# Patient Record
Sex: FEMALE | URBAN - METROPOLITAN AREA
[De-identification: names, ages, dates, MRNs, and addresses within clinical notes are randomized per-mention and may not be internally consistent; named-entity substitution may affect disease eponyms.]

---

## 2024-04-04 ENCOUNTER — HOSPITAL ENCOUNTER (OUTPATIENT)
Facility: HOSPITAL | Age: 24
Discharge: HOME | End: 2024-04-04
Attending: OBSTETRICS & GYNECOLOGY | Admitting: OBSTETRICS & GYNECOLOGY
Payer: COMMERCIAL

## 2024-04-04 VITALS
DIASTOLIC BLOOD PRESSURE: 80 MMHG | SYSTOLIC BLOOD PRESSURE: 130 MMHG | RESPIRATION RATE: 18 BRPM | HEART RATE: 99 BPM | OXYGEN SATURATION: 97 % | TEMPERATURE: 98.2 F

## 2024-04-04 PROCEDURE — 59412 ANTEPARTUM MANIPULATION: CPT

## 2024-04-04 PROCEDURE — 6360000002 HC RX W HCPCS: Performed by: MIDWIFE

## 2024-04-04 PROCEDURE — 96360 HYDRATION IV INFUSION INIT: CPT

## 2024-04-04 PROCEDURE — 59025 FETAL NON-STRESS TEST: CPT

## 2024-04-04 PROCEDURE — 2580000003 HC RX 258: Performed by: MIDWIFE

## 2024-04-04 RX ORDER — SODIUM CHLORIDE 0.9 % (FLUSH) 0.9 %
5-40 SYRINGE (ML) INJECTION PRN
Status: DISCONTINUED | OUTPATIENT
Start: 2024-04-04 | End: 2024-04-04 | Stop reason: HOSPADM

## 2024-04-04 RX ORDER — SERTRALINE HYDROCHLORIDE 25 MG/1
25 TABLET, FILM COATED ORAL DAILY
COMMUNITY

## 2024-04-04 RX ORDER — SODIUM CHLORIDE 0.9 % (FLUSH) 0.9 %
5-40 SYRINGE (ML) INJECTION EVERY 12 HOURS SCHEDULED
Status: DISCONTINUED | OUTPATIENT
Start: 2024-04-04 | End: 2024-04-04 | Stop reason: HOSPADM

## 2024-04-04 RX ORDER — SODIUM CHLORIDE 9 MG/ML
INJECTION, SOLUTION INTRAVENOUS PRN
Status: DISCONTINUED | OUTPATIENT
Start: 2024-04-04 | End: 2024-04-04 | Stop reason: HOSPADM

## 2024-04-04 RX ORDER — CETIRIZINE HYDROCHLORIDE 10 MG/1
10 TABLET ORAL DAILY
COMMUNITY

## 2024-04-04 RX ORDER — TERBUTALINE SULFATE 1 MG/ML
0.25 INJECTION, SOLUTION SUBCUTANEOUS ONCE
Status: COMPLETED | OUTPATIENT
Start: 2024-04-04 | End: 2024-04-04

## 2024-04-04 RX ORDER — SODIUM CHLORIDE, SODIUM LACTATE, POTASSIUM CHLORIDE, CALCIUM CHLORIDE 600; 310; 30; 20 MG/100ML; MG/100ML; MG/100ML; MG/100ML
INJECTION, SOLUTION INTRAVENOUS CONTINUOUS
Status: DISCONTINUED | OUTPATIENT
Start: 2024-04-04 | End: 2024-04-04 | Stop reason: HOSPADM

## 2024-04-04 RX ADMIN — TERBUTALINE SULFATE 0.25 MG: 1 INJECTION, SOLUTION SUBCUTANEOUS at 17:56

## 2024-04-04 RX ADMIN — SODIUM CHLORIDE, POTASSIUM CHLORIDE, SODIUM LACTATE AND CALCIUM CHLORIDE: 600; 310; 30; 20 INJECTION, SOLUTION INTRAVENOUS at 14:45

## 2024-04-04 NOTE — PROCEDURES
PROCEDURE NOTE  Date: 4/4/2024   Name: Danika Miller  YOB: 2000    Procedures  External cephalic version    Breech presentation  37 weeks    Consent confirmed. FHR category 1. US verified breech presentation. Fluid normal. After terbutaline given, fetal head rotated to maternal left with buttocks rotated to maternal right. US verified vertex presentation. Fetal heart rate normal. Mother and baby tolerated the procedure well. Plan for IOL 39 weeks.

## 2024-04-04 NOTE — H&P
History & Physical    Name: Danika Miller MRN: [id number]  SSN: [id number]    YOB: 2000  Age: 24 y.o.  Sex: female        Subjective:     Estimated Date of Delivery: None noted.  OB History    Para Term  AB Living   1             SAB IAB Ectopic Molar Multiple Live Births                    # Outcome Date GA Lbr Hiram/2nd Weight Sex Delivery Anes PTL Lv   1 Current                Ms. Miller is admitted with pregnancy at 37.2 for  ECV . Prenatal course was complicated by breech presentation, and IBS. Please see prenatal records for details.    No past medical history on file.  No past surgical history on file.  Social History     Occupational History    Not on file   Tobacco Use    Smoking status: Not on file    Smokeless tobacco: Not on file   Substance and Sexual Activity    Alcohol use: Not on file    Drug use: Not on file    Sexual activity: Not on file     No family history on file.    Not on File  Prior to Admission medications    Not on File        Review of Systems: Pertinent items are noted in HPI.    Objective:     Vitals:  There were no vitals filed for this visit.     Physical Exam:  General:   alert, appears stated age, and cooperative   Skin:   normal       Lungs:   clear to auscultation bilaterally   Heart:   regular rate and rhythm, S1, S2 normal, no murmur, click, rub or gallop   Breasts:   normal without suspicious masses, skin or nipple changes or axillary nodes, self-exam is taught and encouraged   Abdomen:  soft, non-tender; bowel sounds normal; no masses,  no organomegaly   Cervical exam:  deferred        Membranes:  Intact  Fetal Heart Rate: Baseline: 145 per minute  Variability: moderate  Accelerations: yes  Decelerations: none  Uterine contractions: uterine irritability    Prenatal Labs:   No results found for: \"ABORH\", \"RUBELLAEXT\", \"GRBSEXT\", \"HBSAGEXT\", \"HIVEXT\", \"RPREXT\", \"GONNOEXT\", \"CHLAMEXT\"      Assessment/Plan:     Principal Problem:    Maternal care for

## 2024-04-05 ENCOUNTER — HOSPITAL ENCOUNTER (OUTPATIENT)
Facility: HOSPITAL | Age: 24
Discharge: HOME | End: 2024-04-05
Attending: OBSTETRICS & GYNECOLOGY | Admitting: OBSTETRICS & GYNECOLOGY
Payer: COMMERCIAL

## 2024-04-05 VITALS
TEMPERATURE: 98.7 F | SYSTOLIC BLOOD PRESSURE: 132 MMHG | RESPIRATION RATE: 21 BRPM | OXYGEN SATURATION: 98 % | HEIGHT: 67 IN | DIASTOLIC BLOOD PRESSURE: 75 MMHG | HEART RATE: 98 BPM | WEIGHT: 185 LBS | BODY MASS INDEX: 29.03 KG/M2

## 2024-04-05 PROBLEM — Z33.1 IUP (INTRAUTERINE PREGNANCY), INCIDENTAL: Status: ACTIVE | Noted: 2024-04-05

## 2024-04-05 PROCEDURE — 76818 FETAL BIOPHYS PROFILE W/NST: CPT

## 2024-04-05 PROCEDURE — 99212 OFFICE O/P EST SF 10 MIN: CPT

## 2024-04-05 PROCEDURE — 59025 FETAL NON-STRESS TEST: CPT

## 2024-04-05 NOTE — PROGRESS NOTES
1753 Dr Mcallister at patient bedside.  US performed and breech presentation confirmed.  ECV performed by Dr Mcallister and was successful. Vertex presentation confirmed by US.  Plan for IOL at 39 weeks per MD.  
1950 EFM tracing reviewed with RAHAT Berry CNM. Orders for DC received.     2004  Written and verbal DC instructions given to include daily fetal kick counts, labor precautions. Verbalizes understanding. All questions answered. IV removed. Up to get dressed.     2007 Ambulated off the unit in no apparent distress with family.     
 used

## 2024-04-06 NOTE — PROGRESS NOTES
2030: Pt is a 24 y.o.  at 37w3d, arrived to L&D triage with c/o of intermittent abdominal cramping, describing it as intermittently sharp in nature and lasting 15-20 minutes at a time. Underwent successful ECV yesterday afternoon with Dr. Mcallister. Escorted to bathroom to obtain urine sample, then to triage bed 1 where EFM and TOCO applied, call bell within reach.    : Reactive NST reviewed with BRISA Gusman. Provider to bedside to discuss patient's concerns and plan of care.    : Bedside US performed.     : Discharge instructions provided to include gestational age expectations, round ligament pain and KT-taping, and early labor self care. Reviewed return precautions. Pt verbalized understanding.    : Pt discharged to home. Ambulated off unit with spouse and mother.

## 2024-04-06 NOTE — PROGRESS NOTES
Pt to L&D at 37 4/7 weeks c/o intermittent cramping that is sharp.  NST RGA, occasional contractions noted and pt sts she \"feels better now that she is  here\".  SVE 1/80/-1 Vertex on SVE.  Post ECV procedure on 4/4 for breech.  Bedside LTD US performed by BRISA showed Vertex presentation.  Reviewed precautions, FM and how to contact on call CNM.  DC home with family  Follow up in office as scheduled.     Tracey Gusman, SCOOTER - BRISA  4/5/2024

## 2024-04-06 NOTE — DISCHARGE INSTRUCTIONS
Investigate KT-taping for pregnancy.   General reference: [email or web address]

## 2024-04-12 ENCOUNTER — ANESTHESIA EVENT (OUTPATIENT)
Facility: HOSPITAL | Age: 24
End: 2024-04-12
Payer: COMMERCIAL

## 2024-04-12 ENCOUNTER — ANESTHESIA (OUTPATIENT)
Facility: HOSPITAL | Age: 24
End: 2024-04-12
Payer: COMMERCIAL

## 2024-04-12 ENCOUNTER — HOSPITAL ENCOUNTER (INPATIENT)
Facility: HOSPITAL | Age: 24
LOS: 2 days | Discharge: HOME | End: 2024-04-14
Attending: OBSTETRICS & GYNECOLOGY | Admitting: OBSTETRICS & GYNECOLOGY
Payer: COMMERCIAL

## 2024-04-12 PROBLEM — O42.92 FULL-TERM PREMATURE RUPTURE OF MEMBRANES: Status: ACTIVE | Noted: 2024-04-12

## 2024-04-12 PROBLEM — Z34.93 NORMAL IUP (INTRAUTERINE PREGNANCY) ON PRENATAL ULTRASOUND, THIRD TRIMESTER: Status: ACTIVE | Noted: 2024-04-12

## 2024-04-12 LAB
ABO + RH BLD: NORMAL
BASOPHILS # BLD: 0 K/UL (ref 0–0.1)
BASOPHILS NFR BLD: 0 % (ref 0–2)
BLOOD GROUP ANTIBODIES SERPL: NORMAL
DIFFERENTIAL METHOD BLD: ABNORMAL
EOSINOPHIL # BLD: 0 K/UL (ref 0–0.4)
EOSINOPHIL NFR BLD: 0 % (ref 0–5)
ERYTHROCYTE [DISTWIDTH] IN BLOOD BY AUTOMATED COUNT: 13.6 % (ref 11.6–14.5)
HCT VFR BLD AUTO: 35.4 % (ref 35–45)
HGB BLD-MCNC: 11.9 G/DL (ref 12–16)
IMM GRANULOCYTES # BLD AUTO: 0 K/UL
IMM GRANULOCYTES NFR BLD AUTO: 0 %
LYMPHOCYTES # BLD: 1.7 K/UL (ref 0.9–3.6)
LYMPHOCYTES NFR BLD: 15 % (ref 21–52)
MCH RBC QN AUTO: 30.1 PG (ref 24–34)
MCHC RBC AUTO-ENTMCNC: 33.6 G/DL (ref 31–37)
MCV RBC AUTO: 89.4 FL (ref 78–100)
MONOCYTES # BLD: 1.2 K/UL (ref 0.05–1.2)
MONOCYTES NFR BLD: 11 % (ref 3–10)
NEUTS BAND NFR BLD MANUAL: 1 % (ref 0–5)
NEUTS SEG # BLD: 8.4 K/UL (ref 1.8–8)
NEUTS SEG NFR BLD: 73 % (ref 40–73)
NRBC # BLD: 0 K/UL (ref 0–0.01)
NRBC BLD-RTO: 0 PER 100 WBC
PLATELET # BLD AUTO: 200 K/UL (ref 135–420)
PMV BLD AUTO: 12 FL (ref 9.2–11.8)
RBC # BLD AUTO: 3.96 M/UL (ref 4.2–5.3)
RBC MORPH BLD: ABNORMAL
SPECIMEN EXP DATE BLD: NORMAL
WBC # BLD AUTO: 11.3 K/UL (ref 4.6–13.2)

## 2024-04-12 PROCEDURE — 3700000156 HC EPIDURAL ANESTHESIA

## 2024-04-12 PROCEDURE — 1100000000 HC RM PRIVATE

## 2024-04-12 PROCEDURE — 6360000002 HC RX W HCPCS

## 2024-04-12 PROCEDURE — 86850 RBC ANTIBODY SCREEN: CPT

## 2024-04-12 PROCEDURE — 3700000025 EPIDURAL BLOCK: Performed by: NURSE ANESTHETIST, CERTIFIED REGISTERED

## 2024-04-12 PROCEDURE — 86900 BLOOD TYPING SEROLOGIC ABO: CPT

## 2024-04-12 PROCEDURE — 2500000003 HC RX 250 WO HCPCS: Performed by: NURSE ANESTHETIST, CERTIFIED REGISTERED

## 2024-04-12 PROCEDURE — 7210000100 HC LABOR FEE PER 1 HR

## 2024-04-12 PROCEDURE — 00HU33Z INSERTION OF INFUSION DEVICE INTO SPINAL CANAL, PERCUTANEOUS APPROACH: ICD-10-PCS | Performed by: OBSTETRICS & GYNECOLOGY

## 2024-04-12 PROCEDURE — 86901 BLOOD TYPING SEROLOGIC RH(D): CPT

## 2024-04-12 PROCEDURE — 85025 COMPLETE CBC W/AUTO DIFF WBC: CPT

## 2024-04-12 PROCEDURE — 2580000003 HC RX 258: Performed by: MIDWIFE

## 2024-04-12 RX ORDER — TERBUTALINE SULFATE 1 MG/ML
0.25 INJECTION, SOLUTION SUBCUTANEOUS
Status: DISCONTINUED | OUTPATIENT
Start: 2024-04-12 | End: 2024-04-13 | Stop reason: HOSPADM

## 2024-04-12 RX ORDER — SODIUM CHLORIDE, SODIUM LACTATE, POTASSIUM CHLORIDE, CALCIUM CHLORIDE 600; 310; 30; 20 MG/100ML; MG/100ML; MG/100ML; MG/100ML
INJECTION, SOLUTION INTRAVENOUS CONTINUOUS
Status: DISCONTINUED | OUTPATIENT
Start: 2024-04-12 | End: 2024-04-12

## 2024-04-12 RX ORDER — SODIUM CHLORIDE 0.9 % (FLUSH) 0.9 %
5-40 SYRINGE (ML) INJECTION PRN
Status: DISCONTINUED | OUTPATIENT
Start: 2024-04-12 | End: 2024-04-12

## 2024-04-12 RX ORDER — DOCUSATE SODIUM 100 MG/1
100 CAPSULE, LIQUID FILLED ORAL 2 TIMES DAILY PRN
Status: DISCONTINUED | OUTPATIENT
Start: 2024-04-12 | End: 2024-04-12

## 2024-04-12 RX ORDER — SODIUM CHLORIDE 0.9 % (FLUSH) 0.9 %
5-40 SYRINGE (ML) INJECTION EVERY 12 HOURS SCHEDULED
Status: DISCONTINUED | OUTPATIENT
Start: 2024-04-12 | End: 2024-04-12

## 2024-04-12 RX ORDER — TERBUTALINE SULFATE 1 MG/ML
0.25 INJECTION, SOLUTION SUBCUTANEOUS
Status: DISCONTINUED | OUTPATIENT
Start: 2024-04-12 | End: 2024-04-12

## 2024-04-12 RX ORDER — ONDANSETRON 2 MG/ML
4 INJECTION INTRAMUSCULAR; INTRAVENOUS EVERY 6 HOURS PRN
Status: DISCONTINUED | OUTPATIENT
Start: 2024-04-12 | End: 2024-04-13 | Stop reason: SDUPTHER

## 2024-04-12 RX ORDER — SODIUM CHLORIDE, SODIUM LACTATE, POTASSIUM CHLORIDE, CALCIUM CHLORIDE 600; 310; 30; 20 MG/100ML; MG/100ML; MG/100ML; MG/100ML
INJECTION, SOLUTION INTRAVENOUS CONTINUOUS
Status: DISCONTINUED | OUTPATIENT
Start: 2024-04-12 | End: 2024-04-13 | Stop reason: HOSPADM

## 2024-04-12 RX ORDER — SODIUM CHLORIDE 9 MG/ML
INJECTION, SOLUTION INTRAVENOUS PRN
Status: DISCONTINUED | OUTPATIENT
Start: 2024-04-12 | End: 2024-04-13 | Stop reason: HOSPADM

## 2024-04-12 RX ORDER — SODIUM CHLORIDE, SODIUM LACTATE, POTASSIUM CHLORIDE, AND CALCIUM CHLORIDE .6; .31; .03; .02 G/100ML; G/100ML; G/100ML; G/100ML
1000 INJECTION, SOLUTION INTRAVENOUS PRN
Status: DISCONTINUED | OUTPATIENT
Start: 2024-04-12 | End: 2024-04-13 | Stop reason: HOSPADM

## 2024-04-12 RX ORDER — SODIUM CHLORIDE, SODIUM LACTATE, POTASSIUM CHLORIDE, AND CALCIUM CHLORIDE .6; .31; .03; .02 G/100ML; G/100ML; G/100ML; G/100ML
500 INJECTION, SOLUTION INTRAVENOUS PRN
Status: DISCONTINUED | OUTPATIENT
Start: 2024-04-12 | End: 2024-04-12

## 2024-04-12 RX ORDER — SODIUM CHLORIDE 0.9 % (FLUSH) 0.9 %
5-40 SYRINGE (ML) INJECTION EVERY 12 HOURS SCHEDULED
Status: DISCONTINUED | OUTPATIENT
Start: 2024-04-12 | End: 2024-04-13 | Stop reason: SDUPTHER

## 2024-04-12 RX ORDER — HYDROMORPHONE HYDROCHLORIDE 1 MG/ML
1 INJECTION, SOLUTION INTRAMUSCULAR; INTRAVENOUS; SUBCUTANEOUS
Status: DISCONTINUED | OUTPATIENT
Start: 2024-04-12 | End: 2024-04-12

## 2024-04-12 RX ORDER — HYDROMORPHONE HYDROCHLORIDE 1 MG/ML
1 INJECTION, SOLUTION INTRAMUSCULAR; INTRAVENOUS; SUBCUTANEOUS
Status: DISCONTINUED | OUTPATIENT
Start: 2024-04-12 | End: 2024-04-14 | Stop reason: HOSPADM

## 2024-04-12 RX ORDER — SODIUM CHLORIDE, SODIUM LACTATE, POTASSIUM CHLORIDE, AND CALCIUM CHLORIDE .6; .31; .03; .02 G/100ML; G/100ML; G/100ML; G/100ML
500 INJECTION, SOLUTION INTRAVENOUS PRN
Status: DISCONTINUED | OUTPATIENT
Start: 2024-04-12 | End: 2024-04-13 | Stop reason: HOSPADM

## 2024-04-12 RX ORDER — SODIUM CHLORIDE, SODIUM LACTATE, POTASSIUM CHLORIDE, AND CALCIUM CHLORIDE .6; .31; .03; .02 G/100ML; G/100ML; G/100ML; G/100ML
1000 INJECTION, SOLUTION INTRAVENOUS PRN
Status: DISCONTINUED | OUTPATIENT
Start: 2024-04-12 | End: 2024-04-12

## 2024-04-12 RX ORDER — MISOPROSTOL 200 UG/1
400 TABLET ORAL PRN
Status: DISCONTINUED | OUTPATIENT
Start: 2024-04-12 | End: 2024-04-12

## 2024-04-12 RX ORDER — EPHEDRINE SULFATE/0.9% NACL/PF 50 MG/5 ML
10 SYRINGE (ML) INTRAVENOUS PRN
Status: DISCONTINUED | OUTPATIENT
Start: 2024-04-12 | End: 2024-04-13 | Stop reason: HOSPADM

## 2024-04-12 RX ORDER — SODIUM CHLORIDE 0.9 % (FLUSH) 0.9 %
5-40 SYRINGE (ML) INJECTION 2 TIMES DAILY
Status: DISCONTINUED | OUTPATIENT
Start: 2024-04-12 | End: 2024-04-12

## 2024-04-12 RX ORDER — SIMETHICONE 80 MG
80 TABLET,CHEWABLE ORAL 4 TIMES DAILY PRN
Status: DISCONTINUED | OUTPATIENT
Start: 2024-04-12 | End: 2024-04-13 | Stop reason: SDUPTHER

## 2024-04-12 RX ORDER — ONDANSETRON 4 MG/1
4 TABLET, ORALLY DISINTEGRATING ORAL EVERY 6 HOURS PRN
Status: DISCONTINUED | OUTPATIENT
Start: 2024-04-12 | End: 2024-04-13 | Stop reason: SDUPTHER

## 2024-04-12 RX ORDER — METHYLERGONOVINE MALEATE 0.2 MG/ML
200 INJECTION INTRAVENOUS PRN
Status: DISCONTINUED | OUTPATIENT
Start: 2024-04-12 | End: 2024-04-12

## 2024-04-12 RX ORDER — DOCUSATE SODIUM 100 MG/1
100 CAPSULE, LIQUID FILLED ORAL 2 TIMES DAILY
Status: DISCONTINUED | OUTPATIENT
Start: 2024-04-12 | End: 2024-04-12

## 2024-04-12 RX ORDER — NALOXONE HYDROCHLORIDE 0.4 MG/ML
INJECTION, SOLUTION INTRAMUSCULAR; INTRAVENOUS; SUBCUTANEOUS PRN
Status: DISCONTINUED | OUTPATIENT
Start: 2024-04-12 | End: 2024-04-13 | Stop reason: HOSPADM

## 2024-04-12 RX ORDER — SODIUM CHLORIDE 0.9 % (FLUSH) 0.9 %
5-40 SYRINGE (ML) INJECTION PRN
Status: DISCONTINUED | OUTPATIENT
Start: 2024-04-12 | End: 2024-04-13 | Stop reason: SDUPTHER

## 2024-04-12 RX ORDER — MISOPROSTOL 200 UG/1
400 TABLET ORAL PRN
Status: DISCONTINUED | OUTPATIENT
Start: 2024-04-12 | End: 2024-04-14 | Stop reason: HOSPADM

## 2024-04-12 RX ORDER — ACETAMINOPHEN 500 MG
1000 TABLET ORAL EVERY 6 HOURS PRN
Status: DISCONTINUED | OUTPATIENT
Start: 2024-04-12 | End: 2024-04-13 | Stop reason: HOSPADM

## 2024-04-12 RX ORDER — LIDOCAINE HYDROCHLORIDE 10 MG/ML
30 INJECTION, SOLUTION EPIDURAL; INFILTRATION; INTRACAUDAL; PERINEURAL PRN
Status: DISCONTINUED | OUTPATIENT
Start: 2024-04-12 | End: 2024-04-14 | Stop reason: HOSPADM

## 2024-04-12 RX ORDER — FENTANYL CITRATE 50 UG/ML
25 INJECTION, SOLUTION INTRAMUSCULAR; INTRAVENOUS
Status: DISCONTINUED | OUTPATIENT
Start: 2024-04-12 | End: 2024-04-12

## 2024-04-12 RX ORDER — FENTANYL/ROPIVACAINE/NS/PF 2MCG/ML-.2
8 PLASTIC BAG, INJECTION (ML) INJECTION CONTINUOUS
Status: DISCONTINUED | OUTPATIENT
Start: 2024-04-12 | End: 2024-04-13 | Stop reason: HOSPADM

## 2024-04-12 RX ORDER — ONDANSETRON 2 MG/ML
4 INJECTION INTRAMUSCULAR; INTRAVENOUS EVERY 6 HOURS PRN
Status: DISCONTINUED | OUTPATIENT
Start: 2024-04-12 | End: 2024-04-12

## 2024-04-12 RX ORDER — ONDANSETRON 4 MG/1
4 TABLET, ORALLY DISINTEGRATING ORAL EVERY 6 HOURS PRN
Status: DISCONTINUED | OUTPATIENT
Start: 2024-04-12 | End: 2024-04-12

## 2024-04-12 RX ORDER — SODIUM CHLORIDE 9 MG/ML
INJECTION, SOLUTION INTRAVENOUS PRN
Status: DISCONTINUED | OUTPATIENT
Start: 2024-04-12 | End: 2024-04-12

## 2024-04-12 RX ORDER — TRANEXAMIC ACID 10 MG/ML
1000 INJECTION, SOLUTION INTRAVENOUS
Status: DISCONTINUED | OUTPATIENT
Start: 2024-04-12 | End: 2024-04-12

## 2024-04-12 RX ORDER — LIDOCAINE HYDROCHLORIDE 10 MG/ML
30 INJECTION, SOLUTION EPIDURAL; INFILTRATION; INTRACAUDAL; PERINEURAL PRN
Status: DISCONTINUED | OUTPATIENT
Start: 2024-04-12 | End: 2024-04-12

## 2024-04-12 RX ORDER — TRANEXAMIC ACID 10 MG/ML
1000 INJECTION, SOLUTION INTRAVENOUS
Status: COMPLETED | OUTPATIENT
Start: 2024-04-12 | End: 2024-04-13

## 2024-04-12 RX ORDER — METHYLERGONOVINE MALEATE 0.2 MG/ML
200 INJECTION INTRAVENOUS PRN
Status: DISCONTINUED | OUTPATIENT
Start: 2024-04-12 | End: 2024-04-13 | Stop reason: HOSPADM

## 2024-04-12 RX ORDER — CARBOPROST TROMETHAMINE 250 UG/ML
250 INJECTION, SOLUTION INTRAMUSCULAR PRN
Status: DISCONTINUED | OUTPATIENT
Start: 2024-04-12 | End: 2024-04-12

## 2024-04-12 RX ORDER — CARBOPROST TROMETHAMINE 250 UG/ML
250 INJECTION, SOLUTION INTRAMUSCULAR PRN
Status: DISCONTINUED | OUTPATIENT
Start: 2024-04-12 | End: 2024-04-13 | Stop reason: SDUPTHER

## 2024-04-12 RX ADMIN — HYDROMORPHONE HYDROCHLORIDE 1 MG: 1 INJECTION, SOLUTION INTRAMUSCULAR; INTRAVENOUS; SUBCUTANEOUS at 19:01

## 2024-04-12 RX ADMIN — Medication 2 MILLI-UNITS/MIN: at 20:30

## 2024-04-12 RX ADMIN — SODIUM CHLORIDE, POTASSIUM CHLORIDE, SODIUM LACTATE AND CALCIUM CHLORIDE 1000 ML: 600; 310; 30; 20 INJECTION, SOLUTION INTRAVENOUS at 20:05

## 2024-04-12 RX ADMIN — Medication 8 ML/HR: at 21:58

## 2024-04-12 ASSESSMENT — PAIN DESCRIPTION - LOCATION: LOCATION: ABDOMEN

## 2024-04-12 ASSESSMENT — PAIN DESCRIPTION - DESCRIPTORS: DESCRIPTORS: CRAMPING

## 2024-04-12 NOTE — H&P
History & Physical    Name: Danika Miller MRN: [id number]  SSN: [id number]    YOB: 2000  Age: 24 y.o.  Sex: female        Subjective:     Estimated Date of Delivery: 24  OB History    Para Term  AB Living   1             SAB IAB Ectopic Molar Multiple Live Births                    # Outcome Date GA Lbr Hiram/2nd Weight Sex Delivery Anes PTL Lv   1 Current                Ms. Miller is admitted with pregnancy at 38w3d for Presumptive ROM . Prenatal course was complicated by  anxiety, IBS, UTI with neg BETITO . Please see prenatal records for details.    Past Medical History:   Diagnosis Date    Irritable bowel syndrome (IBS)     Mental disorder      Past Surgical History:   Procedure Laterality Date    WISDOM TOOTH EXTRACTION       Social History     Occupational History    Not on file   Tobacco Use    Smoking status: Never    Smokeless tobacco: Never   Substance and Sexual Activity    Alcohol use: Never    Drug use: Never    Sexual activity: Yes     Partners: Male     Family History   Problem Relation Age of Onset    Lymphoma Father        Allergies   Allergen Reactions    Augmentin [Amoxicillin-Pot Clavulanate] Nausea And Vomiting     Vomiting     Prior to Admission medications    Medication Sig Start Date End Date Taking? Authorizing Provider   Prenatal Vit-Fe Fumarate-FA (PRENATAL PO) Take by mouth    ProviderHilda MD   cetirizine (ZYRTEC) 10 MG tablet Take 1 tablet by mouth daily    Hilda Ruth MD   sertraline (ZOLOFT) 25 MG tablet Take 1 tablet by mouth daily    Provider, MD Hilda        Review of Systems: Pertinent items are noted in HPI.    Objective:     Vitals:  Vitals:    24 1644 24 1649 24 1650 24 1711   BP:   133/63    Pulse: 88 94 85    Resp:       Temp:    98.2 °F (36.8 °C)   TempSrc:    Oral   SpO2: 98% 98%     Weight:       Height:            Physical Exam:  Cervical exam:  Cervix : 3 cm  Effacement 100%  Station:

## 2024-04-13 LAB
HCT VFR BLD AUTO: 28.7 % (ref 35–45)
HGB BLD-MCNC: 9.6 G/DL (ref 12–16)

## 2024-04-13 PROCEDURE — 2500000003 HC RX 250 WO HCPCS: Performed by: MIDWIFE

## 2024-04-13 PROCEDURE — 7100000001 HC PACU RECOVERY - ADDTL 15 MIN

## 2024-04-13 PROCEDURE — 51701 INSERT BLADDER CATHETER: CPT

## 2024-04-13 PROCEDURE — 2580000003 HC RX 258

## 2024-04-13 PROCEDURE — 85018 HEMOGLOBIN: CPT

## 2024-04-13 PROCEDURE — 36415 COLL VENOUS BLD VENIPUNCTURE: CPT

## 2024-04-13 PROCEDURE — 7220000101 HC DELIVERY VAGINAL/SINGLE

## 2024-04-13 PROCEDURE — 7210000100 HC LABOR FEE PER 1 HR

## 2024-04-13 PROCEDURE — 6370000000 HC RX 637 (ALT 250 FOR IP)

## 2024-04-13 PROCEDURE — 1100000000 HC RM PRIVATE

## 2024-04-13 PROCEDURE — 7100000000 HC PACU RECOVERY - FIRST 15 MIN

## 2024-04-13 PROCEDURE — 6360000002 HC RX W HCPCS: Performed by: MIDWIFE

## 2024-04-13 PROCEDURE — 85014 HEMATOCRIT: CPT

## 2024-04-13 PROCEDURE — 6360000002 HC RX W HCPCS

## 2024-04-13 PROCEDURE — 0KQM0ZZ REPAIR PERINEUM MUSCLE, OPEN APPROACH: ICD-10-PCS | Performed by: OBSTETRICS & GYNECOLOGY

## 2024-04-13 RX ORDER — LANOLIN/MINERAL OIL
LOTION (ML) TOPICAL PRN
Status: DISCONTINUED | OUTPATIENT
Start: 2024-04-13 | End: 2024-04-14 | Stop reason: HOSPADM

## 2024-04-13 RX ORDER — SODIUM CHLORIDE 0.9 % (FLUSH) 0.9 %
5-40 SYRINGE (ML) INJECTION EVERY 12 HOURS SCHEDULED
Status: DISCONTINUED | OUTPATIENT
Start: 2024-04-13 | End: 2024-04-14 | Stop reason: HOSPADM

## 2024-04-13 RX ORDER — DOCUSATE SODIUM 100 MG/1
100 CAPSULE, LIQUID FILLED ORAL 2 TIMES DAILY PRN
Status: DISCONTINUED | OUTPATIENT
Start: 2024-04-13 | End: 2024-04-14 | Stop reason: HOSPADM

## 2024-04-13 RX ORDER — MISOPROSTOL 200 UG/1
600 TABLET ORAL PRN
Status: DISCONTINUED | OUTPATIENT
Start: 2024-04-13 | End: 2024-04-14 | Stop reason: HOSPADM

## 2024-04-13 RX ORDER — SENNA AND DOCUSATE SODIUM 50; 8.6 MG/1; MG/1
1 TABLET, FILM COATED ORAL DAILY PRN
Status: DISCONTINUED | OUTPATIENT
Start: 2024-04-13 | End: 2024-04-14 | Stop reason: HOSPADM

## 2024-04-13 RX ORDER — TRANEXAMIC ACID 10 MG/ML
1000 INJECTION, SOLUTION INTRAVENOUS
Status: DISPENSED | OUTPATIENT
Start: 2024-04-13 | End: 2024-04-14

## 2024-04-13 RX ORDER — SODIUM CHLORIDE 9 MG/ML
INJECTION, SOLUTION INTRAVENOUS PRN
Status: DISCONTINUED | OUTPATIENT
Start: 2024-04-13 | End: 2024-04-14 | Stop reason: HOSPADM

## 2024-04-13 RX ORDER — SODIUM CHLORIDE 0.9 % (FLUSH) 0.9 %
5-40 SYRINGE (ML) INJECTION PRN
Status: DISCONTINUED | OUTPATIENT
Start: 2024-04-13 | End: 2024-04-14 | Stop reason: HOSPADM

## 2024-04-13 RX ORDER — MISOPROSTOL 200 UG/1
800 TABLET ORAL PRN
Status: DISCONTINUED | OUTPATIENT
Start: 2024-04-13 | End: 2024-04-14 | Stop reason: HOSPADM

## 2024-04-13 RX ORDER — IBUPROFEN 400 MG/1
800 TABLET ORAL EVERY 8 HOURS SCHEDULED
Status: DISCONTINUED | OUTPATIENT
Start: 2024-04-13 | End: 2024-04-14 | Stop reason: HOSPADM

## 2024-04-13 RX ORDER — ONDANSETRON 2 MG/ML
4 INJECTION INTRAMUSCULAR; INTRAVENOUS EVERY 6 HOURS PRN
Status: DISCONTINUED | OUTPATIENT
Start: 2024-04-13 | End: 2024-04-14 | Stop reason: HOSPADM

## 2024-04-13 RX ORDER — METHYLERGONOVINE MALEATE 0.2 MG/ML
200 INJECTION INTRAVENOUS PRN
Status: DISCONTINUED | OUTPATIENT
Start: 2024-04-13 | End: 2024-04-14 | Stop reason: HOSPADM

## 2024-04-13 RX ORDER — ONDANSETRON 4 MG/1
8 TABLET, ORALLY DISINTEGRATING ORAL EVERY 8 HOURS PRN
Status: DISCONTINUED | OUTPATIENT
Start: 2024-04-13 | End: 2024-04-14 | Stop reason: HOSPADM

## 2024-04-13 RX ORDER — SIMETHICONE 80 MG
80 TABLET,CHEWABLE ORAL EVERY 6 HOURS PRN
Status: DISCONTINUED | OUTPATIENT
Start: 2024-04-13 | End: 2024-04-14 | Stop reason: HOSPADM

## 2024-04-13 RX ORDER — MAGNESIUM CARB/ALUMINUM HYDROX 105-160MG
30 TABLET,CHEWABLE ORAL
Status: COMPLETED | OUTPATIENT
Start: 2024-04-13 | End: 2024-04-13

## 2024-04-13 RX ORDER — FERROUS SULFATE 325(65) MG
325 TABLET ORAL
Status: DISCONTINUED | OUTPATIENT
Start: 2024-04-13 | End: 2024-04-14 | Stop reason: HOSPADM

## 2024-04-13 RX ORDER — CARBOPROST TROMETHAMINE 250 UG/ML
250 INJECTION, SOLUTION INTRAMUSCULAR PRN
Status: DISCONTINUED | OUTPATIENT
Start: 2024-04-13 | End: 2024-04-14 | Stop reason: HOSPADM

## 2024-04-13 RX ORDER — ACETAMINOPHEN 500 MG
1000 TABLET ORAL EVERY 8 HOURS SCHEDULED
Status: DISCONTINUED | OUTPATIENT
Start: 2024-04-13 | End: 2024-04-14 | Stop reason: HOSPADM

## 2024-04-13 RX ORDER — MAGNESIUM CARB/ALUMINUM HYDROX 105-160MG
TABLET,CHEWABLE ORAL
Status: COMPLETED
Start: 2024-04-13 | End: 2024-04-13

## 2024-04-13 RX ADMIN — Medication 166.7 ML: at 04:10

## 2024-04-13 RX ADMIN — IBUPROFEN 800 MG: 400 TABLET, FILM COATED ORAL at 15:16

## 2024-04-13 RX ADMIN — SODIUM CHLORIDE, POTASSIUM CHLORIDE, SODIUM LACTATE AND CALCIUM CHLORIDE: 600; 310; 30; 20 INJECTION, SOLUTION INTRAVENOUS at 02:04

## 2024-04-13 RX ADMIN — SERTRALINE 25 MG: 50 TABLET, FILM COATED ORAL at 09:57

## 2024-04-13 RX ADMIN — FERROUS SULFATE TAB 325 MG (65 MG ELEMENTAL FE) 325 MG: 325 (65 FE) TAB at 06:53

## 2024-04-13 RX ADMIN — Medication 30 ML: at 03:50

## 2024-04-13 RX ADMIN — IBUPROFEN 800 MG: 400 TABLET, FILM COATED ORAL at 21:09

## 2024-04-13 RX ADMIN — SODIUM CHLORIDE 200 MG: 9 INJECTION, SOLUTION INTRAVENOUS at 11:42

## 2024-04-13 RX ADMIN — DOCUSATE SODIUM 100 MG: 100 CAPSULE, LIQUID FILLED ORAL at 09:57

## 2024-04-13 RX ADMIN — IBUPROFEN 800 MG: 400 TABLET, FILM COATED ORAL at 06:53

## 2024-04-13 RX ADMIN — MINERAL OIL 30 ML: 1000 SOLUTION ORAL at 03:50

## 2024-04-13 RX ADMIN — TRANEXAMIC ACID 1000 MG: 10 INJECTION, SOLUTION INTRAVENOUS at 04:30

## 2024-04-13 RX ADMIN — ACETAMINOPHEN 1000 MG: 500 TABLET ORAL at 06:52

## 2024-04-13 RX ADMIN — ONDANSETRON 4 MG: 2 INJECTION INTRAMUSCULAR; INTRAVENOUS at 02:41

## 2024-04-13 ASSESSMENT — PAIN DESCRIPTION - LOCATION
LOCATION: ABDOMEN;PERINEUM
LOCATION: ABDOMEN
LOCATION: ABDOMEN

## 2024-04-13 ASSESSMENT — PAIN DESCRIPTION - ORIENTATION
ORIENTATION: ANTERIOR;LOWER
ORIENTATION: LOWER

## 2024-04-13 ASSESSMENT — PAIN DESCRIPTION - DESCRIPTORS
DESCRIPTORS: CRAMPING;SORE
DESCRIPTORS: CRAMPING

## 2024-04-13 ASSESSMENT — PAIN - FUNCTIONAL ASSESSMENT
PAIN_FUNCTIONAL_ASSESSMENT: ACTIVITIES ARE NOT PREVENTED

## 2024-04-13 ASSESSMENT — PAIN SCALES - GENERAL
PAINLEVEL_OUTOF10: 2
PAINLEVEL_OUTOF10: 3

## 2024-04-13 NOTE — DISCHARGE INSTRUCTIONS
After Your Delivery (the Postpartum Period): Care Instructions  Overview     After childbirth (postpartum period), your body goes through many changes as you recover. In these weeks after delivery, try to take good care of yourself. Get rest whenever you can and accept help from others.  It may take 4 to 6 weeks to feel like yourself again, and possibly longer if you had a  birth. You may feel sore or very tired as you recover. After delivery, you may continue to have contractions as the uterus returns to the size it was before your pregnancy. You will also have some vaginal bleeding. And you may have pain around the vagina as you heal. Several days after delivery you may also have pain and swelling in your breasts as they fill with milk. There are things you can do at home to help ease these discomforts.  After childbirth, it's common to feel emotional. You may feel irritable, cry easily, and feel happy one minute and sad the next. This is called the \"baby blues.\" Hormone changes are one cause of these emotional changes. These feelings usually get better within a couple of weeks. If they don't, talk to your doctor or midwife.  In the first couple of weeks after you give birth, your doctor or midwife may want to check in with you and make a plan for follow-up care. You will likely have a complete postpartum visit in the first 3 months after delivery. At that time, your doctor or midwife will check on your recovery and see how you're doing. But if you have questions or concerns before then, you can always call your doctor or midwife.  Follow-up care is a key part of your treatment and safety. Be sure to make and go to all appointments, and call your doctor if you are having problems. It's also a good idea to know your test results and keep a list of the medicines you take.  How can you care for yourself at home?  Taking care of your body  Use pads instead of tampons for bleeding. After birth, you will have

## 2024-04-13 NOTE — LACTATION NOTE
This note was copied from a baby's chart.  Mom educated on breastfeeding basics--hunger cues, feeding on demand, waking baby if baby sleeps too long between feeds, importance of skin to skin, and importance of rooming in--and use of log sheet. Mom also educated on benefits of breastfeeding for herself and baby. Mom verbalized understanding. No questions at this time.

## 2024-04-13 NOTE — ANESTHESIA PRE PROCEDURE
(METHERGINE) injection 200 mcg  200 mcg IntraMUSCular PRN Karen Berry APRN - BRISA        carboprost (HEMABATE) injection 250 mcg  250 mcg IntraMUSCular PRN Karen Berry APRN - CNM        miSOPROStol (CYTOTEC) tablet 400 mcg  400 mcg Buccal PRN Karen Berry, SCOOTER - BRISA        tranexamic acid-NaCl IVPB premix 1,000 mg  1,000 mg IntraVENous Once PRN Karen Berry APRN - CNM        acetaminophen (TYLENOL) tablet 1,000 mg  1,000 mg Oral Q6H PRN Karen Berry APRN - CNM        witch hazel-glycerin (TUCKS) pad   Topical PRN Karen Berry APRN - CNM        benzocaine-menthol (DERMOPLAST) 20-0.5 % spray   Topical PRN Karen Berry APRN - CNM        simethicone (MYLICON) chewable tablet 80 mg  80 mg Oral 4x Daily PRN Karen Berry APRN - BRISA        sodium chloride flush 0.9 % injection 5-40 mL  5-40 mL IntraVENous 2 times per day Tracey Gusman APRN - CNM        oxytocin (PITOCIN) 30 units in 500 mL infusion  1-20 sally-units/min IntraVENous Continuous Tracey Gusman APRN - CNM 2 mL/hr at 04/12/24 2030 2 sally-units/min at 04/12/24 2030    famotidine (PEPCID) 20 mg in sodium chloride (PF) 0.9 % 10 mL injection  20 mg IntraVENous Q12H PRN Tracey Gusman APRN - CNM        lactated ringers IV soln infusion   IntraVENous Continuous Tracey Gusman APRN - CNM        HYDROmorphone HCl PF (DILAUDID) injection 1 mg  1 mg IntraVENous Q3H PRN Tracey Gusman APRN - CNM   1 mg at 04/12/24 1901    [START ON 4/13/2024] sertraline (ZOLOFT) tablet 25 mg  25 mg Oral Daily Tracey Gusman APRN - CNM        fentaNYL 2 mcg/mL and ROPivacaine 0.2% in sodium chloride 0.9% 100 mL epidural  8 mL/hr Epidural Continuous Faustino Morris, APRN - CRNA        naloxone 0.4 mg in 10 mL sodium chloride syringe   IntraVENous PRN Faustino Morris, APRN - KEILA        phenylephrine (MADHAVI-SYNEPHRINE) injection 0.1 mg  100 mcg IntraVENous PRN Faustino Morris, APRN - CRNA        ePHEDrine

## 2024-04-13 NOTE — L&D DELIVERY NOTE
Steve Miller [[id number]]      Labor Events     Labor: No   Steroids: None  Cervical Ripening Date/Time:      Antibiotics Received during Labor: No  Rupture Date/Time:  24 14:00:00   Rupture Type: SROM  Fluid Color: Meconium  Meconium Consistency: Thin  Fluid Odor: None  Fluid Volume: Moderate  Augmentation: Oxytocin              Anesthesia    Method: Epidural       Labor Event Times      Labor onset date/time:  24 20:30:00     Dilation complete date/time:  24 03:08:00 EDT     Start pushing date/time:  2024 03:43:00   Decision date/time (emergent ):            Delivery Details      Delivery Date: 24 Delivery Time: 04:02:00   Delivery Type: Vaginal, Spontaneous               Presentation    Presentation: Vertex  Position: Left  _: Occiput  _: Anterior       Shoulder Dystocia    Shoulder Dystocia Present?: No       Assisted Delivery Details    Forceps Attempted?: No  Vacuum Extractor Attempted?: No                           Cord    Vessels: 3 Vessels  Complications: None  Delayed Cord Clamping?: Yes  Cord Clamped Date/Time: 2024 04:05:00  Cord Blood Disposition: Lab  Gases Sent?: No              Placenta    Date/Time: 2024 04:09:00  Removal: Spontaneous  Appearance: Intact  Disposition: Discarded       Lacerations    Episiotomy: None  Perineal Lacerations: 2nd  Other Lacerations: no non-perineal laceration  Number of Repair Packets: 2       Vaginal Counts    Initial Count Personnel: RAHAT PERRY CNM  Initial Count Verified By: LORI ALFORD RN  Intial Sponge Count: Correct Intial Needles Count: Correct Intial Instruments Count: Correct   Final Sponges Count: Correct Final Needles  Count: Correct Final Instruments Count: Correct   Final Count Personnel: ORLANDO LEDEZMA  Final Count Verified By: PRASHANTH PALUMBO  Accurate Final Count?: Yes       Blood Loss  Mother: Danika Miller #[id number]     Start of Mother's Information      Delivery Blood Loss  24

## 2024-04-13 NOTE — ANESTHESIA PROCEDURE NOTES
Epidural Block    Patient location during procedure: OB  Start time: 4/12/2024 9:44 PM  End time: 4/12/2024 9:50 PM  Reason for block: labor epidural  Staffing  Performed: resident/CRNA   Resident/CRNA: Faustino Morris APRN - CRNA  Performed by: Faustino Morris APRN - CRNA  Authorized by: Faustino Morris APRN - CRNA    Epidural  Patient position: sitting  Prep: ChloraPrep  Patient monitoring: continuous pulse ox and frequent blood pressure checks  Approach: midline  Location: L4-5  Injection technique: CAMILLE saline  Provider prep: mask and sterile gloves  Needle  Needle type: Tuohy   Needle gauge: 17 G  Needle length: 6 in  Needle insertion depth: 6 cm  Catheter type: end hole  Catheter size: 19 G  Catheter at skin depth: 9 cm  Test dose: negativeCatheter Secured: tegaderm and tape  Assessment  Hemodynamics: stable  Attempts: 1  Outcomes: uncomplicated and patient tolerated procedure well  Additional Notes  Test dose 3ml of 2% lido w/ 1:200k epi, cath loaded with 8ml of 0.2% ropi  Preanesthetic Checklist  Completed: patient identified, IV checked, site marked, risks and benefits discussed, surgical/procedural consents, equipment checked, pre-op evaluation, timeout performed, anesthesia consent given, oxygen available, monitors applied/VS acknowledged, fire risk safety assessment completed and verbalized and blood product R/B/A discussed and consented

## 2024-04-14 VITALS
HEIGHT: 67 IN | DIASTOLIC BLOOD PRESSURE: 54 MMHG | OXYGEN SATURATION: 98 % | TEMPERATURE: 97.8 F | RESPIRATION RATE: 17 BRPM | WEIGHT: 188.2 LBS | HEART RATE: 88 BPM | BODY MASS INDEX: 29.54 KG/M2 | SYSTOLIC BLOOD PRESSURE: 116 MMHG

## 2024-04-14 PROBLEM — O99.019 ANEMIA AFFECTING PREGNANCY: Status: ACTIVE | Noted: 2024-04-14

## 2024-04-14 LAB
ERYTHROCYTE [DISTWIDTH] IN BLOOD BY AUTOMATED COUNT: 14.1 % (ref 11.6–14.5)
HCT VFR BLD AUTO: 31 % (ref 35–45)
HGB BLD-MCNC: 10.1 G/DL (ref 12–16)
MCH RBC QN AUTO: 29.6 PG (ref 24–34)
MCHC RBC AUTO-ENTMCNC: 32.6 G/DL (ref 31–37)
MCV RBC AUTO: 90.9 FL (ref 78–100)
NRBC # BLD: 0 K/UL (ref 0–0.01)
NRBC BLD-RTO: 0 PER 100 WBC
PLATELET # BLD AUTO: 194 K/UL (ref 135–420)
PMV BLD AUTO: 11.9 FL (ref 9.2–11.8)
RBC # BLD AUTO: 3.41 M/UL (ref 4.2–5.3)
WBC # BLD AUTO: 17.7 K/UL (ref 4.6–13.2)

## 2024-04-14 PROCEDURE — 6360000002 HC RX W HCPCS

## 2024-04-14 PROCEDURE — 36415 COLL VENOUS BLD VENIPUNCTURE: CPT

## 2024-04-14 PROCEDURE — 6370000000 HC RX 637 (ALT 250 FOR IP)

## 2024-04-14 PROCEDURE — 2580000003 HC RX 258

## 2024-04-14 PROCEDURE — 85027 COMPLETE CBC AUTOMATED: CPT

## 2024-04-14 RX ORDER — FERROUS SULFATE 325(65) MG
325 TABLET ORAL
Qty: 60 TABLET | Refills: 3 | Status: SHIPPED | OUTPATIENT
Start: 2024-04-15

## 2024-04-14 RX ORDER — IBUPROFEN 800 MG/1
800 TABLET ORAL EVERY 8 HOURS SCHEDULED
Qty: 90 TABLET | Refills: 0 | Status: SHIPPED | OUTPATIENT
Start: 2024-04-14

## 2024-04-14 RX ADMIN — IBUPROFEN 800 MG: 400 TABLET, FILM COATED ORAL at 06:01

## 2024-04-14 RX ADMIN — ACETAMINOPHEN 1000 MG: 500 TABLET ORAL at 13:01

## 2024-04-14 RX ADMIN — FERROUS SULFATE TAB 325 MG (65 MG ELEMENTAL FE) 325 MG: 325 (65 FE) TAB at 08:14

## 2024-04-14 RX ADMIN — SERTRALINE 25 MG: 50 TABLET, FILM COATED ORAL at 08:15

## 2024-04-14 ASSESSMENT — PAIN - FUNCTIONAL ASSESSMENT
PAIN_FUNCTIONAL_ASSESSMENT: ACTIVITIES ARE NOT PREVENTED
PAIN_FUNCTIONAL_ASSESSMENT: ACTIVITIES ARE NOT PREVENTED

## 2024-04-14 ASSESSMENT — PAIN DESCRIPTION - LOCATION
LOCATION: ABDOMEN;PERINEUM
LOCATION: PERINEUM

## 2024-04-14 ASSESSMENT — PAIN DESCRIPTION - DESCRIPTORS
DESCRIPTORS: SORE
DESCRIPTORS: CRAMPING;SORE;ACHING

## 2024-04-14 ASSESSMENT — PAIN SCALES - GENERAL
PAINLEVEL_OUTOF10: 3
PAINLEVEL_OUTOF10: 5

## 2024-04-14 ASSESSMENT — PAIN DESCRIPTION - ORIENTATION
ORIENTATION: LOWER
ORIENTATION: ANTERIOR;LOWER

## 2024-04-14 NOTE — DISCHARGE SUMMARY
Obstetrical Discharge Summary     Name: Danika Miller MRN: [id number]  SSN: [id number]    YOB: 2000  Age: 24 y.o.  Sex: female      Admit Date: 2024    Discharge Date: 2024     Admitting Physician: Pool Mcallister MD     Attending Physician:  Pool Mcallister MD     Admission Diagnoses: Full-term premature rupture of membranes [O42.92]  Normal IUP (intrauterine pregnancy) on prenatal ultrasound, third trimester [Z34.93]    Discharge Diagnoses:     Delivery of a 3.805 kg (8 lb 6.2 oz)   infant via vaginal, spontaneous [250]  on 2024  at 4:02 am .    Steve Miller [[id number]]      Delivery Providers    Delivering clinician: Tracey Gusman, SCOOTER - GUST[facility]O   Provider Role    Mckenna Upton RN Primary Nurse    Kennedi Bergeron RN Primary [place] Nurse    Tracey Gusman, APRN - [place] Midwife                 Steve Miller [[id number]]      Apgars    Living status: Living  Apgars   1 Minute:  5 Minute:  10 Minute 15 Minute 20 Minute   Skin Color: 0  1       Heart Rate: 2  2       Reflex Irritability: 2  2       Muscle Tone: 2  2       Respiratory Effort: 1  2       Total: 7  9               Apgars Assigned By: CALOS PALUMBO                Additional Diagnoses:   Patient Active Problem List   Diagnosis    Maternal care for breech presentation, not applicable or unspecified fetus    IUP (intrauterine pregnancy), incidental    Full-term premature rupture of membranes    Normal IUP (intrauterine pregnancy) on prenatal ultrasound, third trimester     (spontaneous vaginal delivery)    Anemia affecting pregnancy    Postpartum hemorrhage        Labs  ABO/Rh   Date Value Ref Range Status   2024 O POSITIVE  Final     Antibody Screen   Date Value Ref Range Status   2024 NEG  Final          Immunization(s): There is no immunization history for the selected administration types on file for this patient.     * Procedures:     * Discharge Condition: Good    * Hospital

## 2024-04-14 NOTE — PLAN OF CARE
Problem: Pain  Goal: Verbalizes/displays adequate comfort level or baseline comfort level  2024 1602 by Roma Trevino RN  Outcome: Progressing  2024 08 by Mckenna Upton RN  Outcome: Progressing     Problem: Vaginal Birth or  Section  Goal: Fetal and maternal status remain reassuring during the birth process  Description:  Birth OB-Pregnancy care plan goal which identifies if the fetal and maternal status remain reassuring during the birth process  2024 1602 by Roma Trevino RN  Outcome: Progressing  2024 08 by Mckenna Upton RN  Outcome: Progressing     Problem: Infection - Adult  Goal: Absence of infection at discharge  2024 1602 by Roma Trevino RN  Outcome: Progressing  2024 08 by Mckenna Upton RN  Outcome: Progressing  Goal: Absence of infection during hospitalization  2024 1602 by Roma Trevino RN  Outcome: Progressing  2024 08 by Mckenna Upton RN  Outcome: Progressing  Flowsheets (Taken 2024 1931)  Absence of infection during hospitalization:   Assess and monitor for signs and symptoms of infection   Monitor lab/diagnostic results   Monitor all insertion sites i.e., indwelling lines, tubes and drains  Goal: Absence of fever/infection during anticipated neutropenic period  2024 1602 by Roma Trevino RN  Outcome: Progressing  2024 08 by Mckenna Upton RN  Outcome: Progressing     Problem: Safety - Adult  Goal: Free from fall injury  2024 1602 by Roma Trevino RN  Outcome: Progressing  2024 08 by Mckenna Upton RN  Outcome: Progressing     Problem: Discharge Planning  Goal: Discharge to home or other facility with appropriate resources  2024 1602 by Roma Trevino RN  Outcome: Progressing  2024 08 by Mckenna Upton RN  Outcome: Progressing     
  Problem: Pain  Goal: Verbalizes/displays adequate comfort level or baseline comfort level  2024 by Gaby Gomez RN  Outcome: Progressing  2024 by Gaby Gomez RN  Outcome: Progressing  Flowsheets (Taken 2024)  Verbalizes/displays adequate comfort level or baseline comfort level:   Encourage patient to monitor pain and request assistance   Assess pain using appropriate pain scale   Administer analgesics based on type and severity of pain and evaluate response   Implement non-pharmacological measures as appropriate and evaluate response   Consider cultural and social influences on pain and pain management     Problem: Vaginal Birth or  Section  Goal: Fetal and maternal status remain reassuring during the birth process  Description:  Birth OB-Pregnancy care plan goal which identifies if the fetal and maternal status remain reassuring during the birth process  2024 by Gaby Gomez RN  Outcome: Progressing  2024 by Gaby Gomez RN  Outcome: Progressing     Problem: Infection - Adult  Goal: Absence of infection at discharge  2024 by Gaby Gomez RN  Outcome: Progressing  2024 by Gaby Gomez RN  Outcome: Progressing  Flowsheets (Taken 2024)  Absence of infection at discharge:   Assess and monitor for signs and symptoms of infection   Monitor lab/diagnostic results   Monitor all insertion sites i.e., indwelling lines, tubes and drains   Administer medications as ordered   Instruct and encourage patient and family to use good hand hygiene technique  Goal: Absence of infection during hospitalization  2024 by Gaby Gomez RN  Outcome: Progressing  2024 by Gaby Gomez RN  Outcome: Progressing  Flowsheets (Taken 2024)  Absence of infection during hospitalization:   Assess and monitor for signs and symptoms of infection   Monitor lab/diagnostic results   Monitor all 
  Problem: Pain  Goal: Verbalizes/displays adequate comfort level or baseline comfort level  2024 by Roma Trevino RN  Outcome: Progressing  Flowsheets (Taken 2024 0815)  Verbalizes/displays adequate comfort level or baseline comfort level:   Assess pain using appropriate pain scale   Encourage patient to monitor pain and request assistance  2024 06 by Gaby Gomez RN  Outcome: Progressing  2024 041 by Gaby Gomez RN  Outcome: Progressing  Flowsheets (Taken 2024)  Verbalizes/displays adequate comfort level or baseline comfort level:   Encourage patient to monitor pain and request assistance   Assess pain using appropriate pain scale   Administer analgesics based on type and severity of pain and evaluate response   Implement non-pharmacological measures as appropriate and evaluate response   Consider cultural and social influences on pain and pain management     Problem: Vaginal Birth or  Section  Goal: Fetal and maternal status remain reassuring during the birth process  Description:  Birth OB-Pregnancy care plan goal which identifies if the fetal and maternal status remain reassuring during the birth process  2024 by Roma Trevino RN  Outcome: Progressing  2024 by Gaby Gomez RN  Outcome: Progressing  2024 by Gaby Gomez RN  Outcome: Progressing     Problem: Infection - Adult  Goal: Absence of infection at discharge  2024 by Roma Trevino RN  Outcome: Progressing  2024 by Gaby Gomez RN  Outcome: Progressing  2024 by Gaby Gomez RN  Outcome: Progressing  Flowsheets (Taken 2024)  Absence of infection at discharge:   Assess and monitor for signs and symptoms of infection   Monitor lab/diagnostic results   Monitor all insertion sites i.e., indwelling lines, tubes and drains   Administer medications as ordered   Instruct and encourage patient and family to use good 
  Problem: Pain  Goal: Verbalizes/displays adequate comfort level or baseline comfort level  Outcome: Progressing     Problem: Vaginal Birth or  Section  Goal: Fetal and maternal status remain reassuring during the birth process  Description:  Birth OB-Pregnancy care plan goal which identifies if the fetal and maternal status remain reassuring during the birth process  Outcome: Progressing     Problem: Infection - Adult  Goal: Absence of infection at discharge  Outcome: Progressing  Goal: Absence of infection during hospitalization  Outcome: Progressing  Flowsheets (Taken 2024 193)  Absence of infection during hospitalization:   Assess and monitor for signs and symptoms of infection   Monitor lab/diagnostic results   Monitor all insertion sites i.e., indwelling lines, tubes and drains  Goal: Absence of fever/infection during anticipated neutropenic period  Outcome: Progressing     Problem: Safety - Adult  Goal: Free from fall injury  Outcome: Progressing     Problem: Discharge Planning  Goal: Discharge to home or other facility with appropriate resources  Outcome: Progressing     
medications as ordered   Instruct and encourage patient and family to use good hand hygiene technique  4/13/2024 1602 by Roma Trevino RN  Outcome: Progressing  Goal: Absence of fever/infection during anticipated neutropenic period  4/14/2024 0411 by Gaby Gomez RN  Outcome: Progressing  4/13/2024 1602 by Roma Trevino RN  Outcome: Progressing     Problem: Safety - Adult  Goal: Free from fall injury  4/14/2024 0411 by Gaby Gomez RN  Outcome: Progressing  4/13/2024 1602 by Roma Trevino RN  Outcome: Progressing     Problem: Discharge Planning  Goal: Discharge to home or other facility with appropriate resources  4/14/2024 0411 by Gaby Gomez RN  Outcome: Progressing  Flowsheets (Taken 4/13/2024 2109)  Discharge to home or other facility with appropriate resources:   Identify barriers to discharge with patient and caregiver   Arrange for needed discharge resources and transportation as appropriate   Identify discharge learning needs (meds, wound care, etc)   Arrange for interpreters to assist at discharge as needed  4/13/2024 1602 by Roma Trevino, RN  Outcome: Progressing

## 2024-04-14 NOTE — LACTATION NOTE
This note was copied from a baby's chart.     24 1009   Visit Information   Lactation Consult Visit Type IP Initial Consult   Visit Length 60 minutes   Referral Received From Referred by MD   Reason for Visit Education;Normal  Visit   Breast Feeding History/Assessment   Left Breast Soft   Left Nipple Protrude   Right Nipple Protrude;Redness   Right Breast Soft   Breastfeeding History N/A   Feeding Assessment: Maternal Factors   Position and Latch With assistance;Independently;Good technique;Provides breast support   Signs of Transfer Nutritive sucking   Maternal Response Relaxed and confident;Attentive;Comfortable; Comfortable with position   Left Side Feeding   Infant Latch Observations Rooting;Wide open mouth;Good latch on;Sustained rhythmic suck   Infant Position Skin-to-Skin;Football   Infant Response to Feeding Feeding well   LATCH Documentation   Latch 2   Audible Swallowing 2   Type of Nipple 2   Comfort (Breast/Nipple) 2   Hold (Positioning) 1   LATCH Score 9   Care Plan/Breast Care   Breast Care Other (Comment)  (Patient provided nipple ointment)         Mom educated on breastfeeding basics--hunger cues, feeding on demand, waking baby if baby sleeps too long between feeds, importance of skin to skin, positioning and latching, risk of pacifier use and supplemental feedings, and importance of rooming in--and use of log sheet. Mom also educated on benefits of breastfeeding for herself and baby. Mom verbalized understanding. No questions at this time.    Lactation discharge education complete. Discussed engorgement, mastitis, breastfeeding diet, proper nipple/breast care, milk production,and normal  behaviors. Resources provided. Assisted with breastfeeding, successful latch achieved. Will remain available.

## 2024-04-14 NOTE — PROGRESS NOTES
, 38 3/7 weeks pregnant here for rupture of membranes. She is grossly ruptured. Nitrazine positive.   
0250: Spoke to RAHAT Gusman CNM via phone. Requested an SVE on patient.    0308: SVE - complete +2    0310: Spoke to RAHAT Gusman CNM via phone. In route.    0335: RAHAT Gusman CNM at bedside for delivery.    0344: Began pushing.    0402: Delivery of viable male infant. Infant stimulated and cried. Placed skin to skin.    0404: Cord clamped and cut. Infant taken to warmer for assessment r/t color and respiration effort. JILL Baker and LORI Bergeron RN at bedside/radiant warmer for assessment.    0409: Placenta delivered intact. Perineum repaired x 3. QBL 1015mL, postpartum hemorrhage risk is high and discussed with provider.      0458: Repair complete. Counts verified.    
2140: EDDIE Morris CRNA at bedside for epidural placement. Procedure explained to include risks and benefits. Verbalizes understanding. All questions answered.     2141: Sitting up on side of bed. Position reviewed.    2144: Time out    2149: Procedure start/lidocaine    2152: Epidural placed    2153: Test dose    2155: Assisted back to bed after epidural placement. Tolerated procedure well. Vital signs stable.     2156: Loading dosed.      2157: Procedure complete    
Labor Progress Note    Patient seen, fetal heart rate and contraction pattern evaluated, patient examined.  Patient Vitals for the past 1 hrs:   Resp   04/12/24 1901 16       Physical Exam:  Cervical Exam:  3-4 cm dilated    100% effaced    -1 station    Presenting Part: cephalic  Cervical Position: mid position  Membranes:   light mec noted on pad  Uterine Activity: Frequency: Every 4-5 minutes  Fetal Heart Rate: Baseline: 130 per minute  Variability: moderate  Accelerations: yes  Decelerations: none  Cat I FHR tracing     Assessment/Plan:  Reassuring fetal status, Continue plan for vaginal delivery; Start pitocin augmentation per protocol, titrating safely per protocol;  SROM x 6 hrs with minimal cervical change.  Pt already received dilaudid IV for pain management.  Discussed epidural PRN.  Reviewed POC with pt/family and answered all questions.     Tracey Gusman, SCOOTER - GUSTABOM  04/12/24    
Karen Berry, APRN - CNM        HYDROmorphone HCl PF (DILAUDID) injection 1 mg  1 mg IntraVENous Q3H PRN Tracey Gusman, APRN - CNM   1 mg at 24 1901    sertraline (ZOLOFT) tablet 25 mg  25 mg Oral Daily Naseem Tracey MAR, SCOOTER - CNM   25 mg at 24 0815       Vitals:  BP (!) 116/54   Pulse 88   Temp 97.8 °F (36.6 °C) (Oral)   Resp 17   Ht 1.702 m (5' 7\")   Wt 85.4 kg (188 lb 3.2 oz)   SpO2 98%   Breastfeeding Unknown   BMI 29.48 kg/m²   Temp (24hrs), Av.8 °F (36.6 °C), Min:97.6 °F (36.4 °C), Max:97.9 °F (36.6 °C)      Exam:   Patient without distress.  Abdomen soft, fundus firm, nontender  Perineum with normal lochia noted.  Lower extremities are negative for swelling, cords or tenderness.    Labs:   Lab Results   Component Value Date/Time    WBC 17.7 2024 11:18 AM    WBC 11.3 2024 05:41 PM    HGB 10.1 2024 11:18 AM    HGB 9.6 2024 07:48 AM    HGB 11.9 2024 05:41 PM    HCT 31.0 2024 11:18 AM    HCT 28.7 2024 07:48 AM    HCT 35.4 2024 05:41 PM     2024 11:18 AM     2024 05:41 PM       Recent Results (from the past 24 hour(s))   CBC    Collection Time: 24 11:18 AM   Result Value Ref Range    WBC 17.7 (H) 4.6 - 13.2 K/uL    RBC 3.41 (L) 4.20 - 5.30 M/uL    Hemoglobin 10.1 (L) 12.0 - 16.0 g/dL    Hematocrit 31.0 (L) 35.0 - 45.0 %    MCV 90.9 78.0 - 100.0 FL    MCH 29.6 24.0 - 34.0 PG    MCHC 32.6 31.0 - 37.0 g/dL    RDW 14.1 11.6 - 14.5 %    Platelets 194 135 - 420 K/uL    MPV 11.9 (H) 9.2 - 11.8 FL    Nucleated RBCs 0.0 0  WBC    nRBC 0.00 0.00 - 0.01 K/uL     Tracey Gusman, SCOOTER - GUSTABOM  24